# Patient Record
Sex: FEMALE | Race: WHITE
[De-identification: names, ages, dates, MRNs, and addresses within clinical notes are randomized per-mention and may not be internally consistent; named-entity substitution may affect disease eponyms.]

---

## 2019-08-05 ENCOUNTER — HOSPITAL ENCOUNTER (EMERGENCY)
Dept: HOSPITAL 11 - JP.ED | Age: 59
Discharge: HOME | End: 2019-08-05
Payer: MEDICAID

## 2019-08-05 DIAGNOSIS — Z88.5: ICD-10-CM

## 2019-08-05 DIAGNOSIS — R55: ICD-10-CM

## 2019-08-05 DIAGNOSIS — L50.9: Primary | ICD-10-CM

## 2019-08-05 PROCEDURE — 96372 THER/PROPH/DIAG INJ SC/IM: CPT

## 2019-08-05 PROCEDURE — 99282 EMERGENCY DEPT VISIT SF MDM: CPT

## 2019-08-05 NOTE — EDM.PDOC
ED HPI GENERAL MEDICAL PROBLEM





- General


Chief Complaint: Allergic Reaction


Stated Complaint: ALLERGIC REACTION


Time Seen by Provider: 08/05/19 05:05


Source of Information: Reports: Patient, Family


History Limitations: Reports: No Limitations





- History of Present Illness


INITIAL COMMENTS - FREE TEXT/NARRATIVE: 





51-year-old female who had difficulty sleeping last night, got up this morning 

while walking into the kitchen fainted and was "unresponsive" for 1-2 minutes. 

She is also developing widespread hives. No shortness of breath, no nausea or 

vomiting, no pain or fever.


Associated Symptoms: Reports: Malaise, Other (Very fatigued, didn't sleep well 

last night).  Denies: Fever/Chills, Headaches, Loss of Appetite, Shortness of 

Breath





- Related Data


 Allergies











Allergy/AdvReac Type Severity Reaction Status Date / Time


 


morphine Allergy  Nausea Verified 08/05/19 05:06











Home Meds: 


 Home Meds





NK [No Known Home Meds]  08/05/19 [History]











Past Medical History


OB/GYN History: Reports: Pregnancy





- Past Surgical History


Other HEENT Surgeries/Procedures: Septoplasty





Social & Family History





- Family History


Family Medical History: Noncontributory





- Tobacco Use


Smoking Status *Q: Never Smoker





- Caffeine Use


Caffeine Use: Reports: Coffee


Caffeine Use Comment: daily coffee use, some soda





- Recreational Drug Use


Recreational Drug Use: No





ED ROS ALLERGIC REACTION





- Review of Systems


Review Of Systems: See Below


Constitutional: Reports: Malaise.  Denies: Fever, Chills


HEENT: Reports: Other (Some periorbital edema)


Respiratory: Denies: Shortness of Breath, Wheezing


Cardiovascular: Denies: Chest Pain


GI/Abdominal: Denies: Nausea, Vomiting


Skin: Reports: Urticaria


Neurological: Reports: Syncope





ED EXAM GENERAL NO PERIP PULSE





- Physical Exam


Exam: See Below


Exam Limited By: No Limitations


General Appearance: Alert, No Apparent Distress


Eye Exam: Bilateral Eye: Periorbital Changes (Mild periorbital edema)


Throat/Mouth: Normal Inspection


Head: Atraumatic


Respiratory/Chest: No Respiratory Distress, Lungs Clear


Cardiovascular: Regular Rate, Rhythm.  No: Extra Beats


Extremities: No: Pedal Edema


Neurological: Alert, Oriented


Skin Exam: Warm, Dry, Erythema (Widespread patchy asymmetric erythematous 

macular lesions typical of urticaria)





Course





- Vital Signs


Last Recorded V/S: 


 Last Vital Signs











Temp  96.9 F   08/05/19 05:11


 


Pulse  65   08/05/19 05:11


 


Resp  14   08/05/19 05:11


 


BP  103/51 L  08/05/19 05:11


 


Pulse Ox  97   08/05/19 05:11














- Orders/Labs/Meds


Meds: 


Medications














Discontinued Medications














Generic Name Dose Route Start Last Admin





  Trade Name Moni  PRN Reason Stop Dose Admin


 


Diphenhydramine HCl  50 mg  08/05/19 05:21  08/05/19 05:27





  Benadryl  IM  08/05/19 05:22  50 mg





  ONETIME ONE   Administration





     





     





     





     














- Re-Assessments/Exams


Free Text/Narrative Re-Assessment/Exam: 





08/05/19 05:25


patient was given 50 mg of IM Benadryl and additional Benadryl for oral doses 

through [facility]. She can recheck when she is home if hives or recurring.





Departure





- Departure


Time of Disposition: 05:44


Disposition: Home, Self-Care 01


Clinical Impression: 


 Urticaria, Syncope, vasovagal








- Discharge Information


Instructions:  Hives, Easy-to-Read


Referrals: 


PCP,None [Primary Care Provider] - 


Forms:  ED Department Discharge


Care Plan Goals: 


Rest today, repeat Benadryl 25-50 mg every 4 hours and stay hydrated. Consider 

rechecking in the next 24-48 hours if hives or persistent or you develop other 

concerns.